# Patient Record
Sex: MALE | Race: WHITE | NOT HISPANIC OR LATINO | Employment: FULL TIME | URBAN - METROPOLITAN AREA
[De-identification: names, ages, dates, MRNs, and addresses within clinical notes are randomized per-mention and may not be internally consistent; named-entity substitution may affect disease eponyms.]

---

## 2024-10-03 ENCOUNTER — APPOINTMENT (OUTPATIENT)
Dept: GASTROENTEROLOGY | Facility: CLINIC | Age: 54
End: 2024-10-03
Payer: COMMERCIAL

## 2024-10-03 VITALS — HEIGHT: 72 IN | OXYGEN SATURATION: 99 % | WEIGHT: 154 LBS | HEART RATE: 62 BPM | BODY MASS INDEX: 20.86 KG/M2

## 2024-10-03 DIAGNOSIS — Z86.0100 HISTORY OF COLON POLYPS: Primary | ICD-10-CM

## 2024-10-03 PROBLEM — D12.6 ADENOMATOUS POLYP OF COLON: Status: ACTIVE | Noted: 2024-10-03

## 2024-10-03 PROBLEM — D12.6 ADENOMATOUS POLYP OF COLON: Status: RESOLVED | Noted: 2024-10-03 | Resolved: 2024-10-03

## 2024-10-03 PROCEDURE — 3008F BODY MASS INDEX DOCD: CPT

## 2024-10-03 PROCEDURE — 1036F TOBACCO NON-USER: CPT

## 2024-10-03 PROCEDURE — 99204 OFFICE O/P NEW MOD 45 MIN: CPT

## 2024-10-03 RX ORDER — LEVOTHYROXINE SODIUM 25 UG/1
25 TABLET ORAL DAILY
COMMUNITY

## 2024-10-03 RX ORDER — SODIUM, POTASSIUM,MAG SULFATES 17.5-3.13G
1 SOLUTION, RECONSTITUTED, ORAL ORAL ONCE
Qty: 1 EACH | Refills: 0 | Status: SHIPPED | OUTPATIENT
Start: 2024-10-03 | End: 2024-10-03

## 2024-10-03 ASSESSMENT — ENCOUNTER SYMPTOMS
BLOOD IN STOOL: 0
TROUBLE SWALLOWING: 0
FEVER: 0
FATIGUE: 0
ABDOMINAL DISTENTION: 0
CHILLS: 0
CONSTIPATION: 0
COUGH: 0
RECTAL PAIN: 0
SHORTNESS OF BREATH: 0
ABDOMINAL PAIN: 0
ANAL BLEEDING: 0
DIARRHEA: 0
VOMITING: 0
APPETITE CHANGE: 0
NAUSEA: 0

## 2024-10-03 NOTE — PATIENT INSTRUCTIONS
Please schedule your colonoscopy. You will need a ride since this involves sedation.  I will send a bowel prep to your pharmacy.  Clear liquid diet the day before the procedure. Start the 1st part of the prep at 6 pm the night prior and the other half 5 hrs before the procedure.    Diagnostic code is Z86.0100 or ICD-10-CM

## 2024-10-03 NOTE — PROGRESS NOTES
Subjective     History of Present Illness:   Adelso Jimenez is a 53 y.o. male with PMHx of chronic venous insufficiency, hypothyroidism who presents to GI clinic for further evaluation of colonoscopy    Today,  Patient here to discuss scheduling colonoscopy.  Reports he had one 3 years ago and polyps were found so he is due for another.  Follow balanced diet and exercises.  Denies any GI complaints.  Denies constipation, diarrhea, dyspepsia, melena, hematochezia, dysphagia, unintentional weight loss    Denies ETOH, smoking, marijuana  Denies fxh GI cancer or IBD  Abdominal Surgeries: denies    Colonoscopy 9/2021 Dr. Moreno at [facility]: 10 mm SSA ascending, 5 mm hyperplastic polyp rectum, nonbleeding internal hemorrhoids.  Repeat 3 years    EGD 9/2021 Dr. Moreno at [facility] for abdominal pain, dyspepsia and weight loss: Z-line irregular 44 cm from incisors, single benign gastric polyp, erythematous mucosa prepyloric region of stomach.  Pathology: Negative H. pylori      Past Medical History  As per HPI.     Social History  he  reports that he has never smoked. He has never used smokeless tobacco. Alcohol use questions deferred to the physician. He reports that he does not use drugs.     Family History  his family history is not on file.     Review of Systems  Review of Systems   Constitutional:  Negative for appetite change, chills, fatigue and fever.   HENT:  Negative for trouble swallowing.    Respiratory:  Negative for cough and shortness of breath.    Gastrointestinal:  Negative for abdominal distention, abdominal pain, anal bleeding, blood in stool, constipation, diarrhea, nausea, rectal pain and vomiting.       Allergies  No Known Allergies    Medications  Current Outpatient Medications   Medication Instructions    levothyroxine (SYNTHROID, LEVOXYL) 25 mcg, oral, Daily    sodium,potassium,mag sulfates (Suprep Bowel Prep Kit) 17.5-3.13-1.6 gram recon soln solution 2 bottles, oral, Once, Take as directed.        Objective  "  Visit Vitals  Pulse 62      Physical Exam  Constitutional:       Appearance: Normal appearance. He is normal weight.   HENT:      Mouth/Throat:      Mouth: Mucous membranes are dry.      Pharynx: Oropharynx is clear.   Cardiovascular:      Rate and Rhythm: Normal rate and regular rhythm.   Pulmonary:      Effort: Pulmonary effort is normal.      Breath sounds: Normal breath sounds. No wheezing or rhonchi.   Abdominal:      General: Abdomen is flat. Bowel sounds are normal. There is no distension.      Palpations: Abdomen is soft. There is no hepatomegaly.      Tenderness: There is no abdominal tenderness. There is no guarding or rebound. Negative signs include Cervantes's sign.      Hernia: No hernia is present.   Musculoskeletal:         General: Normal range of motion.   Skin:     General: Skin is warm and dry.   Neurological:      General: No focal deficit present.      Mental Status: He is alert and oriented to person, place, and time.   Psychiatric:         Mood and Affect: Mood normal.         Behavior: Behavior normal.           No results found for: \"WBC\", \"HGB\", \"HCT\", \"PLT\"  No results found for: \"NA\", \"K\", \"CL\", \"CO2\", \"BUN\", \"CREATININE\", \"CALCIUM\", \"PROT\", \"BILITOT\", \"ALKPHOS\", \"ALT\", \"AST\", \"GLUCOSE\"        Adelso Jimenez is a 53 y.o. male who presents to GI clinic for colonoscopy.    History of colon polyps  Schedule colonoscopy  Follow up as needed         Annalisa Armstrong, APRN-CNP         "

## 2024-11-01 ENCOUNTER — APPOINTMENT (OUTPATIENT)
Dept: GASTROENTEROLOGY | Facility: CLINIC | Age: 54
End: 2024-11-01
Payer: COMMERCIAL

## 2024-12-05 ENCOUNTER — APPOINTMENT (OUTPATIENT)
Dept: GASTROENTEROLOGY | Facility: EXTERNAL LOCATION | Age: 54
End: 2024-12-05
Payer: COMMERCIAL

## 2024-12-05 DIAGNOSIS — Z86.0100 HISTORY OF COLON POLYPS: ICD-10-CM

## 2024-12-05 PROCEDURE — 45378 DIAGNOSTIC COLONOSCOPY: CPT | Performed by: INTERNAL MEDICINE
